# Patient Record
Sex: MALE | Race: OTHER | Employment: PART TIME | URBAN - METROPOLITAN AREA
[De-identification: names, ages, dates, MRNs, and addresses within clinical notes are randomized per-mention and may not be internally consistent; named-entity substitution may affect disease eponyms.]

---

## 2018-10-13 ENCOUNTER — HOSPITAL ENCOUNTER (INPATIENT)
Age: 23
LOS: 9 days | Discharge: HOME | DRG: 885 | End: 2018-10-22
Attending: PSYCHIATRY & NEUROLOGY | Admitting: PSYCHIATRY & NEUROLOGY
Payer: MEDICAID

## 2018-10-13 DIAGNOSIS — F33.2 SEVERE EPISODE OF RECURRENT MAJOR DEPRESSIVE DISORDER, WITHOUT PSYCHOTIC FEATURES (HCC): Primary | ICD-10-CM

## 2018-10-13 PROBLEM — F32.9 MAJOR DEPRESSION, SINGLE EPISODE: Status: ACTIVE | Noted: 2018-10-13

## 2018-10-13 LAB
ACETAMINOPHEN LEVEL: <5 UG/ML (ref 10–30)
ALBUMIN SERPL-MCNC: 4.6 G/DL (ref 3.9–4.9)
ALP BLD-CCNC: 56 U/L (ref 35–104)
ALT SERPL-CCNC: 57 U/L (ref 0–41)
AMPHETAMINE SCREEN, URINE: NORMAL
ANION GAP SERPL CALCULATED.3IONS-SCNC: 13 MEQ/L (ref 7–13)
AST SERPL-CCNC: 31 U/L (ref 0–40)
BARBITURATE SCREEN URINE: NORMAL
BASOPHILS ABSOLUTE: 0.1 K/UL (ref 0–0.2)
BASOPHILS RELATIVE PERCENT: 0.7 %
BENZODIAZEPINE SCREEN, URINE: NORMAL
BILIRUB SERPL-MCNC: 0.4 MG/DL (ref 0–1.2)
BILIRUBIN URINE: NEGATIVE
BLOOD, URINE: ABNORMAL
BUN BLDV-MCNC: 14 MG/DL (ref 6–20)
CALCIUM SERPL-MCNC: 9.5 MG/DL (ref 8.6–10.2)
CANNABINOID SCREEN URINE: NORMAL
CHLORIDE BLD-SCNC: 100 MEQ/L (ref 98–107)
CLARITY: CLEAR
CO2: 24 MEQ/L (ref 22–29)
COCAINE METABOLITE SCREEN URINE: NORMAL
COLOR: YELLOW
CREAT SERPL-MCNC: 0.79 MG/DL (ref 0.7–1.2)
EKG ATRIAL RATE: 97 BPM
EKG P AXIS: 52 DEGREES
EKG P-R INTERVAL: 140 MS
EKG Q-T INTERVAL: 364 MS
EKG QRS DURATION: 130 MS
EKG QTC CALCULATION (BAZETT): 462 MS
EKG R AXIS: -39 DEGREES
EKG T AXIS: 19 DEGREES
EKG VENTRICULAR RATE: 97 BPM
EOSINOPHILS ABSOLUTE: 0.1 K/UL (ref 0–0.7)
EOSINOPHILS RELATIVE PERCENT: 0.7 %
ETHANOL PERCENT: NORMAL G/DL
ETHANOL: <10 MG/DL (ref 0–0.08)
GFR AFRICAN AMERICAN: >60
GFR NON-AFRICAN AMERICAN: >60
GLOBULIN: 3.1 G/DL (ref 2.3–3.5)
GLUCOSE BLD-MCNC: 100 MG/DL (ref 74–109)
GLUCOSE URINE: NEGATIVE MG/DL
HCT VFR BLD CALC: 49.8 % (ref 42–52)
HEMOGLOBIN: 16.7 G/DL (ref 14–18)
KETONES, URINE: NEGATIVE MG/DL
LEUKOCYTE ESTERASE, URINE: NEGATIVE
LYMPHOCYTES ABSOLUTE: 1.4 K/UL (ref 1–4.8)
LYMPHOCYTES RELATIVE PERCENT: 14.8 %
Lab: NORMAL
MCH RBC QN AUTO: 28.3 PG (ref 27–31.3)
MCHC RBC AUTO-ENTMCNC: 33.5 % (ref 33–37)
MCV RBC AUTO: 84.4 FL (ref 80–100)
MONOCYTES ABSOLUTE: 0.5 K/UL (ref 0.2–0.8)
MONOCYTES RELATIVE PERCENT: 5.4 %
NEUTROPHILS ABSOLUTE: 7.3 K/UL (ref 1.4–6.5)
NEUTROPHILS RELATIVE PERCENT: 78.4 %
NITRITE, URINE: NEGATIVE
OPIATE SCREEN URINE: NORMAL
PDW BLD-RTO: 13.7 % (ref 11.5–14.5)
PH UA: 6.5 (ref 5–9)
PHENCYCLIDINE SCREEN URINE: NORMAL
PLATELET # BLD: 272 K/UL (ref 130–400)
POTASSIUM SERPL-SCNC: 3.9 MEQ/L (ref 3.5–5.1)
PROTEIN UA: ABNORMAL MG/DL
RBC # BLD: 5.91 M/UL (ref 4.7–6.1)
RBC UA: ABNORMAL /HPF (ref 0–2)
SALICYLATE, SERUM: <0.3 MG/DL (ref 15–30)
SODIUM BLD-SCNC: 137 MEQ/L (ref 132–144)
SPECIFIC GRAVITY UA: 1.02 (ref 1–1.03)
TOTAL CK: 67 U/L (ref 0–190)
TOTAL PROTEIN: 7.7 G/DL (ref 6.4–8.1)
TSH SERPL DL<=0.05 MIU/L-ACNC: 0.35 UIU/ML (ref 0.27–4.2)
URINE REFLEX TO CULTURE: YES
UROBILINOGEN, URINE: 1 E.U./DL
WBC # BLD: 9.3 K/UL (ref 4.8–10.8)
WBC UA: ABNORMAL /HPF (ref 0–5)

## 2018-10-13 PROCEDURE — 82550 ASSAY OF CK (CPK): CPT

## 2018-10-13 PROCEDURE — 93005 ELECTROCARDIOGRAM TRACING: CPT

## 2018-10-13 PROCEDURE — G0480 DRUG TEST DEF 1-7 CLASSES: HCPCS

## 2018-10-13 PROCEDURE — 81001 URINALYSIS AUTO W/SCOPE: CPT

## 2018-10-13 PROCEDURE — 1240000000 HC EMOTIONAL WELLNESS R&B

## 2018-10-13 PROCEDURE — 80053 COMPREHEN METABOLIC PANEL: CPT

## 2018-10-13 PROCEDURE — 6370000000 HC RX 637 (ALT 250 FOR IP): Performed by: PSYCHIATRY & NEUROLOGY

## 2018-10-13 PROCEDURE — 87086 URINE CULTURE/COLONY COUNT: CPT

## 2018-10-13 PROCEDURE — 36415 COLL VENOUS BLD VENIPUNCTURE: CPT

## 2018-10-13 PROCEDURE — 84443 ASSAY THYROID STIM HORMONE: CPT

## 2018-10-13 PROCEDURE — 99285 EMERGENCY DEPT VISIT HI MDM: CPT

## 2018-10-13 PROCEDURE — 85025 COMPLETE CBC W/AUTO DIFF WBC: CPT

## 2018-10-13 PROCEDURE — 80307 DRUG TEST PRSMV CHEM ANLYZR: CPT

## 2018-10-13 RX ORDER — SERTRALINE HYDROCHLORIDE 25 MG/1
25 TABLET, FILM COATED ORAL DAILY
Status: DISCONTINUED | OUTPATIENT
Start: 2018-10-13 | End: 2018-10-14

## 2018-10-13 RX ORDER — HYDROXYZINE HYDROCHLORIDE 50 MG/ML
50 INJECTION, SOLUTION INTRAMUSCULAR EVERY 6 HOURS PRN
Status: DISCONTINUED | OUTPATIENT
Start: 2018-10-13 | End: 2018-10-22 | Stop reason: HOSPADM

## 2018-10-13 RX ORDER — MAGNESIUM HYDROXIDE/ALUMINUM HYDROXICE/SIMETHICONE 120; 1200; 1200 MG/30ML; MG/30ML; MG/30ML
30 SUSPENSION ORAL PRN
Status: DISCONTINUED | OUTPATIENT
Start: 2018-10-13 | End: 2018-10-22 | Stop reason: HOSPADM

## 2018-10-13 RX ORDER — BENZTROPINE MESYLATE 1 MG/ML
2 INJECTION INTRAMUSCULAR; INTRAVENOUS 2 TIMES DAILY PRN
Status: DISCONTINUED | OUTPATIENT
Start: 2018-10-13 | End: 2018-10-22 | Stop reason: HOSPADM

## 2018-10-13 RX ORDER — LISINOPRIL 10 MG/1
5 TABLET ORAL DAILY
Status: ON HOLD | COMMUNITY
Start: 2017-07-04 | End: 2018-10-16 | Stop reason: CLARIF

## 2018-10-13 RX ORDER — TRAZODONE HYDROCHLORIDE 50 MG/1
50 TABLET ORAL NIGHTLY PRN
Status: DISCONTINUED | OUTPATIENT
Start: 2018-10-13 | End: 2018-10-15

## 2018-10-13 RX ORDER — HYDROXYZINE PAMOATE 25 MG/1
25 CAPSULE ORAL ONCE
Status: DISCONTINUED | OUTPATIENT
Start: 2018-10-13 | End: 2018-10-13

## 2018-10-13 RX ORDER — HYDROXYZINE PAMOATE 50 MG/1
50 CAPSULE ORAL EVERY 6 HOURS PRN
Status: DISCONTINUED | OUTPATIENT
Start: 2018-10-13 | End: 2018-10-22 | Stop reason: HOSPADM

## 2018-10-13 RX ORDER — ACETAMINOPHEN 325 MG/1
650 TABLET ORAL EVERY 4 HOURS PRN
Status: DISCONTINUED | OUTPATIENT
Start: 2018-10-13 | End: 2018-10-22 | Stop reason: HOSPADM

## 2018-10-13 RX ORDER — HALOPERIDOL 5 MG/ML
5 INJECTION INTRAMUSCULAR EVERY 6 HOURS PRN
Status: DISCONTINUED | OUTPATIENT
Start: 2018-10-13 | End: 2018-10-22 | Stop reason: HOSPADM

## 2018-10-13 RX ORDER — HALOPERIDOL 5 MG
5 TABLET ORAL EVERY 6 HOURS PRN
Status: DISCONTINUED | OUTPATIENT
Start: 2018-10-13 | End: 2018-10-22 | Stop reason: HOSPADM

## 2018-10-13 RX ORDER — LISINOPRIL 10 MG/1
10 TABLET ORAL DAILY
Status: DISCONTINUED | OUTPATIENT
Start: 2018-10-13 | End: 2018-10-16

## 2018-10-13 RX ADMIN — LISINOPRIL 10 MG: 10 TABLET ORAL at 12:25

## 2018-10-13 RX ADMIN — SERTRALINE HYDROCHLORIDE 25 MG: 25 TABLET ORAL at 14:58

## 2018-10-13 ASSESSMENT — SLEEP AND FATIGUE QUESTIONNAIRES
DIFFICULTY ARISING: YES
DIFFICULTY FALLING ASLEEP: YES
AVERAGE NUMBER OF SLEEP HOURS: 6
DO YOU HAVE DIFFICULTY SLEEPING: YES
SLEEP PATTERN: DIFFICULTY FALLING ASLEEP
DIFFICULTY STAYING ASLEEP: YES
RESTFUL SLEEP: NO
DO YOU USE A SLEEP AID: NO

## 2018-10-13 ASSESSMENT — ENCOUNTER SYMPTOMS
RHINORRHEA: 0
ABDOMINAL PAIN: 0
DIARRHEA: 0
COLOR CHANGE: 0
COUGH: 0
SORE THROAT: 0
SHORTNESS OF BREATH: 0
BLOOD IN STOOL: 0
NAUSEA: 0
VOMITING: 0

## 2018-10-13 ASSESSMENT — PATIENT HEALTH QUESTIONNAIRE - PHQ9
SUM OF ALL RESPONSES TO PHQ QUESTIONS 1-9: 27
SUM OF ALL RESPONSES TO PHQ QUESTIONS 1-9: 27

## 2018-10-13 NOTE — ED NOTES
3 wext to return call for report. Pt cooperative. Vital signs being obtained at this time. Tony Infante  [facility]  10/13/18 9234

## 2018-10-13 NOTE — ED NOTES
Have re-faxed notes from alternate fax machines. Still waiting for [facility] approval.  Pt has remained awake all night. Relaxed in recliner watching TV.      Rachel Palm RN  10/13/18 7098

## 2018-10-13 NOTE — ED NOTES
[facility]       Rusty Beach RN  10/13/18 8407

## 2018-10-13 NOTE — ED NOTES
Spoke with , Selvin Becerra , who asked for assessments to be faxed for possible indigent bed approval.     Fadi Ruiz RN  10/13/18 3761

## 2018-10-13 NOTE — ED PROVIDER NOTES
deterioration in the patient's condition which required my urgent intervention. Procedures    FINAL IMPRESSION      1. Severe episode of recurrent major depressive disorder, without psychotic features ([facility] [address]          DISPOSITION/PLAN   DISPOSITION Decision To Admit 10/13/2018 06:41:01 AM      PATIENT REFERRED TO:  No follow-up provider specified.     DISCHARGE MEDICATIONS:  New Prescriptions    No medications on file          (Please notethat portions of this note were completed with a voice recognition program.  Efforts were made to edit the dictations but occasionally words are mis-transcribed.)    Alicia Titus MD (electronically signed)  Emergency Physician Assistant          Alicia Titus MD  10/13/18 [id number] [facility] MD Bob  10/13/18 1028

## 2018-10-13 NOTE — H&P
has some mental health problems but he does not know what. SOCIAL HISTORY: he was born ej [place], and raised in [place]. He has completed 3 semesters in college at [employer]. He is single, childless. He lives with his mother and sister. He works part time at [employer] SUBSTANCE ABUSE HISTORY: he denied smoking cigarettes. He drinks alcohol, \"sometimes\". He said he does not use drugs.  Denies having been in chemical dependency treatment in the past.      VITALS: /81   Pulse 102   Temp 97.9 °F (36.6 °C) (Oral)   Resp 18   Ht 5' 5\" (1.651 m)   Wt 145 lb (65.8 kg)   SpO2 99%   BMI 24.13 kg/m²     MENTAL STATUS EXAM: Appearance: alert, well groomed, in hospital gown Behavior: cooperative Mood: depressed Affect: mood congruent Speech: with low tone, slow Thought Process: relatively organized Thought Content: positive suicidal ideations; no homicidal ideations; no paranoia or delusions Perception: denies hallucinations Orientation: oriented to time, place, self Concentration: fair Memory: fairly good Abstraction: able to demonstrate abstract thinking Fund of knowledge: good Insight: fair Judgement: fair    LABS:   Admission on 10/13/2018   Component Date Value Ref Range Status    Acetaminophen Level 10/13/2018 <5* 10 - 30 ug/mL Final    WBC 10/13/2018 9.3  4.8 - 10.8 K/uL Final    RBC 10/13/2018 5.91  4.70 - 6.10 M/uL Final    Hemoglobin 10/13/2018 16.7  14.0 - 18.0 g/dL Final    Hematocrit 10/13/2018 49.8  42.0 - 52.0 % Final    MCV 10/13/2018 84.4  80.0 - 100.0 fL Final    MCH 10/13/2018 28.3  27.0 - 31.3 pg Final    MCHC 10/13/2018 33.5  33.0 - 37.0 % Final    RDW 10/13/2018 13.7  11.5 - 14.5 % Final    Platelets 95/89/7437 272  130 - 400 K/uL Final    Neutrophils % 10/13/2018 78.4  % Final    Lymphocytes % 10/13/2018 14.8  % Final    Monocytes % 10/13/2018 5.4  % Final    Eosinophils % 10/13/2018 0.7  % Final    Basophils % 10/13/2018 0.7  % Final    Neutrophils # 10/13/2018

## 2018-10-14 PROCEDURE — 1240000000 HC EMOTIONAL WELLNESS R&B

## 2018-10-14 PROCEDURE — 6370000000 HC RX 637 (ALT 250 FOR IP): Performed by: PSYCHIATRY & NEUROLOGY

## 2018-10-14 RX ADMIN — LISINOPRIL 10 MG: 10 TABLET ORAL at 08:14

## 2018-10-14 RX ADMIN — SERTRALINE HYDROCHLORIDE 25 MG: 25 TABLET ORAL at 08:15

## 2018-10-14 NOTE — PROGRESS NOTES
Pt. declined to attend the 0900 community meeting, despite staff encouragement.  Electronically signed by Jazmyn Lopez on 10/14/2018 at 11:28 AM

## 2018-10-14 NOTE — PLAN OF CARE
Problem: Altered Mood, Depressive Behavior:  Goal: Able to verbalize and/or display a decrease in depressive symptoms  Able to verbalize and/or display a decrease in depressive symptoms   Outcome: Ongoing    Goal: Ability to disclose and discuss suicidal ideas will improve  Ability to disclose and discuss suicidal ideas will improve   Outcome: Ongoing

## 2018-10-14 NOTE — PROGRESS NOTES
Patient declined to attend [facility]ication Education [facility] despite staff encouragement.      Electronically signed by Disha Sherman RN on 10/14/2018 at 5:23 PM

## 2018-10-15 PROBLEM — F32.2 CURRENT SEVERE EPISODE OF MAJOR DEPRESSIVE DISORDER WITHOUT PSYCHOTIC FEATURES WITHOUT PRIOR EPISODE (HCC): Status: ACTIVE | Noted: 2018-10-13

## 2018-10-15 LAB — URINE CULTURE, ROUTINE: NORMAL

## 2018-10-15 PROCEDURE — 93010 ELECTROCARDIOGRAM REPORT: CPT | Performed by: INTERNAL MEDICINE

## 2018-10-15 PROCEDURE — 1240000000 HC EMOTIONAL WELLNESS R&B

## 2018-10-15 PROCEDURE — 6370000000 HC RX 637 (ALT 250 FOR IP): Performed by: PSYCHIATRY & NEUROLOGY

## 2018-10-15 PROCEDURE — 99233 SBSQ HOSP IP/OBS HIGH 50: CPT | Performed by: PSYCHIATRY & NEUROLOGY

## 2018-10-15 RX ORDER — TRAZODONE HYDROCHLORIDE 100 MG/1
100 TABLET ORAL NIGHTLY
Status: DISCONTINUED | OUTPATIENT
Start: 2018-10-15 | End: 2018-10-22 | Stop reason: HOSPADM

## 2018-10-15 RX ADMIN — SERTRALINE HYDROCHLORIDE 50 MG: 50 TABLET ORAL at 08:43

## 2018-10-15 RX ADMIN — LISINOPRIL 10 MG: 10 TABLET ORAL at 08:43

## 2018-10-15 ASSESSMENT — LIFESTYLE VARIABLES: HISTORY_ALCOHOL_USE: NO

## 2018-10-15 NOTE — PROGRESS NOTES
on file     Other Topics Concern    Not on file     Social History Narrative    No narrative on file       LABS:  Lab Results   Component Value Date     10/13/2018    BUN 14 10/13/2018    CREATININE 0.79 10/13/2018    TSH 0.346 10/13/2018    WBC 9.3 10/13/2018     No results found for: PHENYTOIN, PHENOBARB, VALPROATE, CBMZ  No results found for: INR, PROTIME  No results found for: APTT  Recent Labs      10/13/18   0307   ETOH  <10           ROS:  [x] All negative/unchanged except if checked.  Explain positive(checked items) below:  [] Constitutional  [] Eyes  [] Ear/Nose/Mouth/Throat  [] Respiratory  [] CV  [] GI  []   [] Musculoskeletal  [] Skin/Breast  [] Neurological  [] Endocrine  [] Heme/Lymph  [] Allergic/Immunologic    Explanation:     MEDICATIONS:    Current Facility-Administered Medications:     lisinopril (PRINIVIL;ZESTRIL) tablet 10 mg, 10 mg, Oral, Daily, Jens Ann MD, 10 mg at 10/14/18 2846    acetaminophen (TYLENOL) tablet 650 mg, 650 mg, Oral, Q4H PRN, Jens Ann MD    traZODone (DESYREL) tablet 50 mg, 50 mg, Oral, Nightly PRN, Jens Ann MD    benztropine mesylate (COGENTIN) injection 2 mg, 2 mg, Intramuscular, BID PRN, Jens Ann MD    magnesium hydroxide (MILK OF MAGNESIA) 400 MG/5ML suspension 30 mL, 30 mL, Oral, Daily PRN, Jens Ann MD    aluminum & magnesium hydroxide-simethicone (MAALOX) 200-200-20 MG/5ML suspension 30 mL, 30 mL, Oral, PRN, Jens Ann MD    hydrOXYzine (VISTARIL) capsule 50 mg, 50 mg, Oral, Q6H PRN **OR** hydrOXYzine (VISTARIL) injection 50 mg, 50 mg, Intramuscular, Q6H PRN, Jens Ann MD    haloperidol (HALDOL) tablet 5 mg, 5 mg, Oral, Q6H PRN **OR** haloperidol lactate (HALDOL) injection 5 mg, 5 mg, Intramuscular, Q6H PRN, Jens Ann MD    sertraline (ZOLOFT) tablet 25 mg, 25 mg, Oral, Daily, Jens Ann MD, 25 mg at 10/14/18 4897    PSYCHOTHERAPY/COUNSELING:  [x] Therapeutic interview  [x] Supportive  [] CBT  []

## 2018-10-15 NOTE — PROGRESS NOTES
10/13/18   0307   WBC  9.3   HGB  16.7   PLT  272     Recent Labs      10/13/18   0307   NA  137   K  3.9   CL  100   CO2  24   BUN  14   CREATININE  0.79   GLUCOSE  100     Recent Labs      10/13/18   0307   BILITOT  0.4   ALKPHOS  56   AST  31   ALT  57*     Lab Results   Component Value Date    LABAMPH Neg 10/13/2018    BARBSCNU Neg 10/13/2018    LABBENZ Neg 10/13/2018    OPIATESCREENURINE Neg 10/13/2018    PHENCYCLIDINESCREENURINE Neg 10/13/2018    ETOH <10 10/13/2018     Lab Results   Component Value Date    TSH 0.346 10/13/2018     No results found for: LITHIUM  No results found for: VALPROATE, CBMZ    RISK ASSESSMENT: high suicide risk    Treatment Plan:  Reviewed current Medications with the patient. MEds as ordered  Risks, benefits, side effects, drug-to-drug interactions and alternatives to treatment were discussed. Collateral information: pending  CD evaluation  Encourage patient to attend group and other milieu activities.   Discharge planning discussed with the patient and treatment team.    PSYCHOTHERAPY/COUNSELING:  [x] Therapeutic interview  [x] Supportive  [] CBT  [] Ongoing  [] Other    [x] Patient continues to need, on a daily basis, active treatment furnished directly by or requiring the supervision of inpatient psychiatric personnel      Anticipated Length of stay:            Electronically signed by Dewayne Stephens MD on 10/15/2018 at 2:18 PM

## 2018-10-16 PROCEDURE — 6370000000 HC RX 637 (ALT 250 FOR IP): Performed by: PSYCHIATRY & NEUROLOGY

## 2018-10-16 PROCEDURE — 99232 SBSQ HOSP IP/OBS MODERATE 35: CPT | Performed by: PSYCHIATRY & NEUROLOGY

## 2018-10-16 PROCEDURE — 6370000000 HC RX 637 (ALT 250 FOR IP): Performed by: NURSE PRACTITIONER

## 2018-10-16 PROCEDURE — 1240000000 HC EMOTIONAL WELLNESS R&B

## 2018-10-16 RX ORDER — LISINOPRIL 5 MG/1
5 TABLET ORAL DAILY
Status: ON HOLD | COMMUNITY
End: 2018-10-22 | Stop reason: HOSPADM

## 2018-10-16 RX ORDER — LISINOPRIL 5 MG/1
5 TABLET ORAL DAILY
Status: DISCONTINUED | OUTPATIENT
Start: 2018-10-16 | End: 2018-10-16

## 2018-10-16 RX ORDER — LISINOPRIL 5 MG/1
5 TABLET ORAL DAILY
Status: DISCONTINUED | OUTPATIENT
Start: 2018-10-16 | End: 2018-10-22 | Stop reason: HOSPADM

## 2018-10-16 RX ORDER — SERTRALINE HYDROCHLORIDE 100 MG/1
100 TABLET, FILM COATED ORAL DAILY
Status: DISCONTINUED | OUTPATIENT
Start: 2018-10-17 | End: 2018-10-20

## 2018-10-16 RX ADMIN — TRAZODONE HYDROCHLORIDE 100 MG: 100 TABLET ORAL at 20:44

## 2018-10-16 RX ADMIN — SERTRALINE HYDROCHLORIDE 50 MG: 50 TABLET ORAL at 08:48

## 2018-10-16 RX ADMIN — LISINOPRIL 5 MG: 5 TABLET ORAL at 17:06

## 2018-10-16 NOTE — PROGRESS NOTES
Call to [facility] to clarify Lisinopril order. Dose is 5 mg Lisinopril daily. Last fill was 7/4/2017.

## 2018-10-17 PROCEDURE — 1240000000 HC EMOTIONAL WELLNESS R&B

## 2018-10-17 PROCEDURE — 6370000000 HC RX 637 (ALT 250 FOR IP): Performed by: PSYCHIATRY & NEUROLOGY

## 2018-10-17 PROCEDURE — 99232 SBSQ HOSP IP/OBS MODERATE 35: CPT | Performed by: PSYCHIATRY & NEUROLOGY

## 2018-10-17 PROCEDURE — 6370000000 HC RX 637 (ALT 250 FOR IP): Performed by: NURSE PRACTITIONER

## 2018-10-17 RX ADMIN — SERTRALINE 100 MG: 100 TABLET, FILM COATED ORAL at 09:49

## 2018-10-17 RX ADMIN — TRAZODONE HYDROCHLORIDE 100 MG: 100 TABLET ORAL at 21:26

## 2018-10-17 RX ADMIN — LISINOPRIL 5 MG: 5 TABLET ORAL at 09:49

## 2018-10-17 NOTE — PROGRESS NOTES
BEHAVIORAL HEALTH FOLLOW-UP NOTE     10/17/2018     Patient was seen and examined in person, Chart reviewed   Patient's case discussed with staff/team    Chief Complaint: Depression, SI    Interim History:     Pt visit with his mom did not go well  Pt is still feeling suicidal  Hopeless and worthless feeling  Still feel tired and depressed  Slept better  Appetite:   [] Normal/Unchanged  [] Increased  [x] Decreased      Sleep:       [] Normal/Unchanged  [x] Fair       [] Poor              Energy:    [] Normal/Unchanged  [] Increased  [x] Decreased        SI [x] Present  [] Absent    HI  []Present  [x] Absent     Aggression:  [] yes  [x] no    Patient is [] able  [x] unable to CONTRACT FOR SAFETY     PAST MEDICAL/PSYCHIATRIC HISTORY:   Past Medical History:   Diagnosis Date    Aortic valve defect 1994    from birth       FAMILY/SOCIAL HISTORY:  No family history on file. Social History     Social History    Marital status: Single     Spouse name: N/A    Number of children: N/A    Years of education: N/A     Occupational History    Not on file. Social History Main Topics    Smoking status: Never Smoker    Smokeless tobacco: Not on file    Alcohol use No    Drug use: No    Sexual activity: Not on file     Other Topics Concern    Not on file     Social History Narrative    No narrative on file           ROS:  [x] All negative/unchanged except if checked.  Explain positive(checked items) below:  [] Constitutional  [] Eyes  [] Ear/Nose/Mouth/Throat  [] Respiratory  [] CV  [] GI  []   [] Musculoskeletal  [] Skin/Breast  [] Neurological  [] Endocrine  [] Heme/Lymph  [] Allergic/Immunologic    Explanation:     MEDICATIONS:    Current Facility-Administered Medications:     sertraline (ZOLOFT) tablet 100 mg, 100 mg, Oral, Daily, Pamela Christiansen MD, 100 mg at 10/17/18 0949    lisinopril (PRINIVIL;ZESTRIL) tablet 5 mg, 5 mg, Oral, Daily, LAZARUS Castillo - CNP, 5 mg at 10/17/18 0949    traZODone results for input(s): WBC, HGB, PLT in the last 72 hours. No results for input(s): NA, K, CL, CO2, BUN, CREATININE, GLUCOSE in the last 72 hours. No results for input(s): BILITOT, ALKPHOS, AST, ALT in the last 72 hours. Lab Results   Component Value Date    LABAMPH Neg 10/13/2018    BARBSCNU Neg 10/13/2018    LABBENZ Neg 10/13/2018    OPIATESCREENURINE Neg 10/13/2018    PHENCYCLIDINESCREENURINE Neg 10/13/2018    ETOH <10 10/13/2018     Lab Results   Component Value Date    TSH 0.346 10/13/2018     No results found for: LITHIUM  No results found for: VALPROATE, CBMZ    RISK ASSESSMENT: high suicide risk    Treatment Plan:  Reviewed current Medications with the patient. Increase Zoloft and trazodone as ordered  Risks, benefits, side effects, drug-to-drug interactions and alternatives to treatment were discussed. Collateral information: pending  CD evaluation  Encourage patient to attend group and other milieu activities.   Discharge planning discussed with the patient and treatment team.    PSYCHOTHERAPY/COUNSELING:  [x] Therapeutic interview  [x] Supportive  [] CBT  [] Ongoing  [] Other    [x] Patient continues to need, on a daily basis, active treatment furnished directly by or requiring the supervision of inpatient psychiatric personnel      Anticipated Length of stay: 2-3 days            Electronically signed by Arias Kendall MD on 10/17/2018 at 11:42 AM

## 2018-10-18 PROCEDURE — 6370000000 HC RX 637 (ALT 250 FOR IP): Performed by: PSYCHIATRY & NEUROLOGY

## 2018-10-18 PROCEDURE — 99232 SBSQ HOSP IP/OBS MODERATE 35: CPT | Performed by: PSYCHIATRY & NEUROLOGY

## 2018-10-18 PROCEDURE — 6370000000 HC RX 637 (ALT 250 FOR IP): Performed by: NURSE PRACTITIONER

## 2018-10-18 PROCEDURE — 1240000000 HC EMOTIONAL WELLNESS R&B

## 2018-10-18 RX ADMIN — SERTRALINE 100 MG: 100 TABLET, FILM COATED ORAL at 08:49

## 2018-10-18 RX ADMIN — LISINOPRIL 5 MG: 5 TABLET ORAL at 08:49

## 2018-10-18 RX ADMIN — HYDROXYZINE PAMOATE 50 MG: 50 CAPSULE ORAL at 13:58

## 2018-10-18 NOTE — PROGRESS NOTES
Informed therapist about patients anxiety, that causes panic attacks and racing heart . Charge nurse informed.

## 2018-10-18 NOTE — PROGRESS NOTES
traZODone (DESYREL) tablet 100 mg, 100 mg, Oral, Nightly, Yudy Rosales MD, 100 mg at 10/17/18 2126    acetaminophen (TYLENOL) tablet 650 mg, 650 mg, Oral, Q4H PRN, Phoebe Ayala MD    benztropine mesylate (COGENTIN) injection 2 mg, 2 mg, Intramuscular, BID PRN, Phoebe Ayala MD    magnesium hydroxide (MILK OF MAGNESIA) 400 MG/5ML suspension 30 mL, 30 mL, Oral, Daily PRN, Phoebe Ayala MD    aluminum & magnesium hydroxide-simethicone (MAALOX) 200-200-20 MG/5ML suspension 30 mL, 30 mL, Oral, PRN, Phoebe Ayala MD    hydrOXYzine (VISTARIL) capsule 50 mg, 50 mg, Oral, Q6H PRN **OR** hydrOXYzine (VISTARIL) injection 50 mg, 50 mg, Intramuscular, Q6H PRN, Phoebe Ayala MD    haloperidol (HALDOL) tablet 5 mg, 5 mg, Oral, Q6H PRN **OR** haloperidol lactate (HALDOL) injection 5 mg, 5 mg, Intramuscular, Q6H PRN, Phoebe Ayala MD      Examination:  /82   Pulse 78   Temp 98 °F (36.7 °C) (Oral)   Resp 18   Ht 5' 5\" (1.651 m)   Wt 145 lb (65.8 kg)   SpO2 98%   BMI 24.13 kg/m²   Gait - steady  Medication side effects(SE): no    Mental Status Examination:    Level of consciousness:  within normal limits   Appearance:  fair grooming and fair hygiene  Behavior/Motor:  psychomotor retardation  Attitude toward examiner:  cooperative  Speech:  slow   Mood: constricted, decreased range and depressed  Affect:  mood congruent  Thought processes:  slow   Thought content:  Suicidal Ideation:  passive  Delusions:  no evidence of delusions  Perceptual Disturbance:  denies any perceptual disturbance  Cognition:  oriented to person, place, and time   Concentration poor  Insight better  Judgement poor     ASSESSMENT:   Patient symptoms are:  [] Well controlled  [x] Improving  [] Worsening  [] No change      Diagnosis:   Principal Problem:    Current severe episode of major depressive disorder without psychotic features without prior episode ([facility] [address]  Resolved Problems:    * No resolved hospital problems. *      LABS:    No results for input(s): WBC, HGB, PLT in the last 72 hours. No results for input(s): NA, K, CL, CO2, BUN, CREATININE, GLUCOSE in the last 72 hours. No results for input(s): BILITOT, ALKPHOS, AST, ALT in the last 72 hours. Lab Results   Component Value Date    LABAMPH Neg 10/13/2018    BARBSCNU Neg 10/13/2018    LABBENZ Neg 10/13/2018    OPIATESCREENURINE Neg 10/13/2018    PHENCYCLIDINESCREENURINE Neg 10/13/2018    ETOH <10 10/13/2018     Lab Results   Component Value Date    TSH 0.346 10/13/2018     No results found for: LITHIUM  No results found for: VALPROATE, CBMZ    RISK ASSESSMENT: high suicide risk    Treatment Plan:  Reviewed current Medications with the patient. Increase Zoloft and trazodone as ordered  Risks, benefits, side effects, drug-to-drug interactions and alternatives to treatment were discussed. Collateral information: pending  CD evaluation  Encourage patient to attend group and other milieu activities.   Discharge planning discussed with the patient and treatment team.    PSYCHOTHERAPY/COUNSELING:  [x] Therapeutic interview  [x] Supportive  [] CBT  [] Ongoing  [] Other    [x] Patient continues to need, on a daily basis, active treatment furnished directly by or requiring the supervision of inpatient psychiatric personnel      Anticipated Length of stay: 2-3 days            Electronically signed by Ghada Nugent MD on 10/18/2018 at 11:03 AM

## 2018-10-19 PROCEDURE — 6370000000 HC RX 637 (ALT 250 FOR IP): Performed by: NURSE PRACTITIONER

## 2018-10-19 PROCEDURE — 99232 SBSQ HOSP IP/OBS MODERATE 35: CPT | Performed by: PSYCHIATRY & NEUROLOGY

## 2018-10-19 PROCEDURE — 1240000000 HC EMOTIONAL WELLNESS R&B

## 2018-10-19 PROCEDURE — 6370000000 HC RX 637 (ALT 250 FOR IP): Performed by: PSYCHIATRY & NEUROLOGY

## 2018-10-19 RX ADMIN — SERTRALINE 100 MG: 100 TABLET, FILM COATED ORAL at 09:03

## 2018-10-19 RX ADMIN — LISINOPRIL 5 MG: 5 TABLET ORAL at 09:03

## 2018-10-19 RX ADMIN — TRAZODONE HYDROCHLORIDE 100 MG: 100 TABLET ORAL at 22:10

## 2018-10-19 RX ADMIN — HYDROXYZINE PAMOATE 50 MG: 50 CAPSULE ORAL at 09:35

## 2018-10-19 NOTE — CARE COORDINATION
FAMILY COLLATERAL NOTE    Family/Support Name: Marsha Rosales #: [phone]  Relationship to Pt: mother        Per Mom: My son just called and said he was upset with me cause I overstepped my boundaries. Patients ex gf messaged my daughter and said patient keeps calling her from [address]. I have requested for her not to answer and I told him this and he said me and his sister need to stay out of his business. My daughter asked the ex gf if she told patient that she did not want any more contact with him and she said she did. She told my daughter it was nothing threatening on the phone , patient was just telling ex that he wants to be friends. She told him he needs to work on himself. She has had a bf the entire time and had my son on the side but he could not handle it. I thought she was sending him mixed messages and she told my daughter she has not that she has been straight forward with him. Has not said to me that he has had any thoughts of hurting himself today , he said he just has stomach issues. I am worried that after he is discharged he will try to go to her work and the police will be called. He has not told me this but there is a good change it may happen.    Electronically signed by Mario Brumfield on 10/19/2018 at 2:24 PM

## 2018-10-19 NOTE — FLOWSHEET NOTE
Patient is isolative to room and to himself while on the unit. Poor eye contact. Reports feeling minimal improvement since admission but is vague in details. Often replies, \"okay\" and \"sure. \" Patient reports intermittent SI, he verbally contracts for safety while on the unit. When asked if writer could get him anything, patient states, Leighann Wise you could get me a pair of scissors so I can slice my wrists open. \" Patient laughs almost uncontrollably while stating this. He continues to verbalize his ability to remain safe on the unit. Reassurance provided, team staff notified. Immediately speaks of feeling more support from his friends that he has been in contact with that he didn't know he had and is feeling better in that aspect only. Incongruent affect at times. . C/o increased anxiety this morning, improved this afternoon and is currently resting in bed. Denies HI, A/V hallucinations. Reports feeling hopeless, helpless and worthless. Will continue to monitor.  Electronically signed by Talisha Cunha RN on 10/19/2018 at 4:52 PM

## 2018-10-20 PROCEDURE — 1240000000 HC EMOTIONAL WELLNESS R&B

## 2018-10-20 PROCEDURE — 6370000000 HC RX 637 (ALT 250 FOR IP): Performed by: PSYCHIATRY & NEUROLOGY

## 2018-10-20 PROCEDURE — 6370000000 HC RX 637 (ALT 250 FOR IP): Performed by: NURSE PRACTITIONER

## 2018-10-20 RX ADMIN — SERTRALINE HYDROCHLORIDE 50 MG: 50 TABLET ORAL at 17:25

## 2018-10-20 RX ADMIN — LISINOPRIL 5 MG: 5 TABLET ORAL at 09:06

## 2018-10-20 RX ADMIN — TRAZODONE HYDROCHLORIDE 100 MG: 100 TABLET ORAL at 22:03

## 2018-10-20 RX ADMIN — SERTRALINE 100 MG: 100 TABLET, FILM COATED ORAL at 09:06

## 2018-10-20 NOTE — PROGRESS NOTES
[facility] FOLLOW-UP NOTE     10/20/2018     Patient was seen and examined in person, Chart reviewed   Patient's case discussed with staff/team    Chief Complaint: Depression, SI    Interim History:     Patient said his mood was \"better a couple of days ago\", but he felt again depressed yesterday, and today. He said he had thought he was going to be able to be friends with his ex- girlfriend; however she did not want to talk to him anymore. He is upset because his mother, in her attempts at making him let go of the girlfriend, has implied that he is \"crazy\". He said his sleep has been \"OK\"; says trazodone has been giving him \"stuffy nose\", but he passes out and sleeps. However, he said that yesterday, after having his eyes closed for a while (trying to rest), he woke up and saw a \"spider\" on the wall; he was scared, but when he looked again it had disappeared. He said his appetite was variable; ate most of the food on his tray last night, but \"was not hungry\" this AM. He said his depression is \"a little less'. He denied having suicidal ideations today; however he said he still had them yesterday. He denied homicidal ideations, denied having hallucinations, paranoia or other delusions. Staff reported that he had asked an RN to give him a scissors to cut his wrist; however when asked about it today, he said he was \"joking\". Appetite:   [] Normal/Unchanged  [] Increased  [x] Decreased      Sleep:       [] Normal/Unchanged  [x] Fair       [] Poor              Energy:    [] Normal/Unchanged  [] Increased  [x] Decreased        SI [x] Present off and on [] Absent    HI  []Present  [x] Absent     Aggression:  [] yes  [x] no    Patient is [x] able  [] unable to CONTRACT FOR SAFETY     PAST MEDICAL/PSYCHIATRIC HISTORY:   Past Medical History:   Diagnosis Date    Aortic valve defect 1994    from birth       FAMILY/SOCIAL HISTORY:  No family history on file.   Social History     Social History    Marital status:

## 2018-10-21 PROCEDURE — 1240000000 HC EMOTIONAL WELLNESS R&B

## 2018-10-21 PROCEDURE — 6370000000 HC RX 637 (ALT 250 FOR IP): Performed by: PSYCHIATRY & NEUROLOGY

## 2018-10-21 PROCEDURE — 6370000000 HC RX 637 (ALT 250 FOR IP): Performed by: NURSE PRACTITIONER

## 2018-10-21 RX ADMIN — LISINOPRIL 5 MG: 5 TABLET ORAL at 09:16

## 2018-10-21 RX ADMIN — SERTRALINE 150 MG: 100 TABLET, FILM COATED ORAL at 09:15

## 2018-10-21 RX ADMIN — TRAZODONE HYDROCHLORIDE 100 MG: 100 TABLET ORAL at 22:02

## 2018-10-21 NOTE — PROGRESS NOTES
Pt visible on unit talking on phone mostly. Pt isolates to self with minimal socialization with peers. Pt appears sad and worried. Pt attended evening group.

## 2018-10-21 NOTE — PROGRESS NOTES
History Narrative    No narrative on file           ROS:  [x] All negative/unchanged except if checked.  Explain positive(checked items) below:  [] Constitutional  [] Eyes  [] Ear/Nose/Mouth/Throat  [] Respiratory  [] CV  [] GI  []   [] Musculoskeletal  [] Skin/Breast  [] Neurological  [] Endocrine  [] Heme/Lymph  [] Allergic/Immunologic    Explanation:     MEDICATIONS:    Current Facility-Administered Medications:     sertraline (ZOLOFT) tablet 150 mg, 150 mg, Oral, Daily, Theresa Garg MD, 150 mg at 10/21/18 0915    lisinopril (PRINIVIL;ZESTRIL) tablet 5 mg, 5 mg, Oral, Daily, LAZARUS aCstillo - CNP, 5 mg at 10/21/18 0916    traZODone (DESYREL) tablet 100 mg, 100 mg, Oral, Nightly, Ailyn Calderon MD, 100 mg at 10/20/18 2203    acetaminophen (TYLENOL) tablet 650 mg, 650 mg, Oral, Q4H PRN, Theresa Garg MD    benztropine mesylate (COGENTIN) injection 2 mg, 2 mg, Intramuscular, BID PRN, Theresa Garg MD    magnesium hydroxide (MILK OF MAGNESIA) 400 MG/5ML suspension 30 mL, 30 mL, Oral, Daily PRN, Theresa Garg MD    aluminum & magnesium hydroxide-simethicone (MAALOX) 200-200-20 MG/5ML suspension 30 mL, 30 mL, Oral, PRN, Theresa Garg MD    hydrOXYzine (VISTARIL) capsule 50 mg, 50 mg, Oral, Q6H PRN, 50 mg at 10/19/18 0935 **OR** hydrOXYzine (VISTARIL) injection 50 mg, 50 mg, Intramuscular, Q6H PRN, Theresa Garg MD    haloperidol (HALDOL) tablet 5 mg, 5 mg, Oral, Q6H PRN **OR** haloperidol lactate (HALDOL) injection 5 mg, 5 mg, Intramuscular, Q6H PRN, Theresa Garg MD      Examination:  BP (!) 138/99 Comment: RN notified  Pulse 116 Comment: RN notified  Temp 97 °F (36.1 °C) (Oral)   Resp 18   Ht 5' 5\" (1.651 m)   Wt 145 lb (65.8 kg)   SpO2 98%   BMI 24.13 kg/m²   Gait - steady  Medication side effects(SE): no    Mental Status Examination:    Level of consciousness:  within normal limits   Appearance:  fair grooming and good hygiene  Behavior/Motor:  psychomotor retardation,

## 2018-10-22 VITALS
TEMPERATURE: 97 F | WEIGHT: 145 LBS | SYSTOLIC BLOOD PRESSURE: 113 MMHG | RESPIRATION RATE: 16 BRPM | HEIGHT: 65 IN | BODY MASS INDEX: 24.16 KG/M2 | HEART RATE: 86 BPM | DIASTOLIC BLOOD PRESSURE: 82 MMHG | OXYGEN SATURATION: 98 %

## 2018-10-22 PROCEDURE — 6370000000 HC RX 637 (ALT 250 FOR IP): Performed by: PSYCHIATRY & NEUROLOGY

## 2018-10-22 PROCEDURE — 6370000000 HC RX 637 (ALT 250 FOR IP): Performed by: NURSE PRACTITIONER

## 2018-10-22 PROCEDURE — 99239 HOSP IP/OBS DSCHRG MGMT >30: CPT | Performed by: PSYCHIATRY & NEUROLOGY

## 2018-10-22 RX ORDER — TRAZODONE HYDROCHLORIDE 100 MG/1
100 TABLET ORAL NIGHTLY
Qty: 15 TABLET | Refills: 2 | Status: SHIPPED | OUTPATIENT
Start: 2018-10-22 | End: 2018-10-22

## 2018-10-22 RX ORDER — LISINOPRIL 5 MG/1
5 TABLET ORAL DAILY
Qty: 15 TABLET | Refills: 2 | Status: SHIPPED | OUTPATIENT
Start: 2018-10-22 | End: 2018-10-22

## 2018-10-22 RX ORDER — TRAZODONE HYDROCHLORIDE 100 MG/1
100 TABLET ORAL NIGHTLY
Qty: 14 TABLET | Refills: 0 | Status: SHIPPED | OUTPATIENT
Start: 2018-10-22

## 2018-10-22 RX ORDER — LISINOPRIL 5 MG/1
5 TABLET ORAL DAILY
Qty: 14 TABLET | Refills: 0 | Status: SHIPPED | OUTPATIENT
Start: 2018-10-22

## 2018-10-22 RX ADMIN — SERTRALINE 150 MG: 100 TABLET, FILM COATED ORAL at 09:31

## 2018-10-22 RX ADMIN — LISINOPRIL 5 MG: 5 TABLET ORAL at 09:31

## 2018-10-22 NOTE — DISCHARGE SUMMARY
resolved hospital problems. *      LABS:    No results for input(s): WBC, HGB, PLT in the last 72 hours. No results for input(s): NA, K, CL, CO2, BUN, CREATININE, GLUCOSE in the last 72 hours. No results for input(s): BILITOT, ALKPHOS, AST, ALT in the last 72 hours. Lab Results   Component Value Date    LABAMPH Neg 10/13/2018    BARBSCNU Neg 10/13/2018    LABBENZ Neg 10/13/2018    OPIATESCREENURINE Neg 10/13/2018    PHENCYCLIDINESCREENURINE Neg 10/13/2018    ETOH <10 10/13/2018     Lab Results   Component Value Date    TSH 0.346 10/13/2018     No results found for: LITHIUM  No results found for: VALPROATE, CBMZ    RISK ASSESSMENT AT DISCHARGE: Low risk for suicide and homicide. Treatment Plan:  Reviewed current Medications with the patient. Education provided on the complaince with treatment. Risks, benefits, side effects, drug-to-drug interactions and alternatives to treatment were discussed. Encourage patient to attend outpatient follow up appointment and therapy. Patient was advised to call the outpatient provider, visit the nearest ED or call 911 if symptoms are not manageable. Patient's family member was contacted prior to the discharge.         Medication List      START taking these medications    sertraline 50 MG tablet  Commonly known as:  ZOLOFT  Take 3 tablets by mouth daily     traZODone 100 MG tablet  Commonly known as:  DESYREL  Take 1 tablet by mouth nightly        CHANGE how you take these medications    lisinopril 5 MG tablet  Commonly known as:  PRINIVIL;ZESTRIL  Take 1 tablet by mouth daily  What changed:  · medication strength  · Another medication with the same name was removed. Continue taking this medication, and follow the directions you see here.            Where to Get Your Medications      These medications were sent to [address] [place] [address] #19 - [facility], [address] [facility] [place] [id number]    Phone:

## 2018-10-22 NOTE — PROGRESS NOTES
Pt noted up on unit bright , reported he is going home and is happy about that.  took am meds, denies questions, encouraged pt to stay on Bp med, and pt is aware of increase in Zoloft.  Denies any complaints, stated he is not suicidal and is aware of our  er is always open if he were to become suicidal.

## 2023-03-11 VITALS
OXYGEN SATURATION: 100 % | SYSTOLIC BLOOD PRESSURE: 144 MMHG | HEIGHT: 65 IN | BODY MASS INDEX: 27.49 KG/M2 | RESPIRATION RATE: 18 BRPM | TEMPERATURE: 98 F | WEIGHT: 165 LBS | HEART RATE: 94 BPM | DIASTOLIC BLOOD PRESSURE: 78 MMHG

## 2023-03-11 PROCEDURE — 10060 I&D ABSCESS SIMPLE/SINGLE: CPT

## 2023-03-11 PROCEDURE — 99284 EMERGENCY DEPT VISIT MOD MDM: CPT

## 2023-03-11 PROCEDURE — 96372 THER/PROPH/DIAG INJ SC/IM: CPT

## 2023-03-11 ASSESSMENT — PAIN SCALES - GENERAL: PAINLEVEL_OUTOF10: 2

## 2023-03-11 ASSESSMENT — PAIN - FUNCTIONAL ASSESSMENT: PAIN_FUNCTIONAL_ASSESSMENT: 0-10

## 2023-03-12 ENCOUNTER — HOSPITAL ENCOUNTER (EMERGENCY)
Age: 28
Discharge: HOME | End: 2023-03-12
Attending: EMERGENCY MEDICINE
Payer: MEDICAID

## 2023-03-12 DIAGNOSIS — L03.011 PARONYCHIA OF FINGER OF RIGHT HAND: Primary | ICD-10-CM

## 2023-03-12 PROCEDURE — 6360000002 HC RX W HCPCS: Performed by: EMERGENCY MEDICINE

## 2023-03-12 RX ORDER — SULFAMETHOXAZOLE AND TRIMETHOPRIM 800; 160 MG/1; MG/1
1 TABLET ORAL 2 TIMES DAILY
Qty: 20 TABLET | Refills: 0 | Status: SHIPPED | OUTPATIENT
Start: 2023-03-12 | End: 2023-03-22

## 2023-03-12 RX ORDER — BACITRACIN, NEOMYCIN, POLYMYXIN B 400; 3.5; 5 [USP'U]/G; MG/G; [USP'U]/G
OINTMENT TOPICAL
Qty: 1 G | Refills: 0 | Status: SHIPPED | OUTPATIENT
Start: 2023-03-12 | End: 2023-03-22

## 2023-03-12 RX ORDER — KETOROLAC TROMETHAMINE 30 MG/ML
30 INJECTION, SOLUTION INTRAMUSCULAR; INTRAVENOUS ONCE
Status: COMPLETED | OUTPATIENT
Start: 2023-03-12 | End: 2023-03-12

## 2023-03-12 RX ADMIN — KETOROLAC TROMETHAMINE 30 MG: 30 INJECTION, SOLUTION INTRAMUSCULAR; INTRAVENOUS at 00:36

## 2023-03-12 NOTE — ED NOTES
Discharge instructions reviewed with pt. Pt verbalized understanding with no questions or concerns. Resps even, non labored. Skin p/w/d. ATB and ATB ointment send to pharmacy. Pt is aware and understands.      Odilon Tobar RN  03/12/23 0586

## 2023-03-12 NOTE — DISCHARGE INSTRUCTIONS
Return for fever, worsening symptoms or concerns. Warm soaks daily. Follow-up with your doctor. Thank you.

## 2023-03-12 NOTE — ED TRIAGE NOTES
Error     Patient reports ingrown hair on right index finger starting one week ago. Presents for increased swelling and pustule to the area. Denies other symptoms. Vitals stable.